# Patient Record
Sex: FEMALE
[De-identification: names, ages, dates, MRNs, and addresses within clinical notes are randomized per-mention and may not be internally consistent; named-entity substitution may affect disease eponyms.]

---

## 2017-04-24 ENCOUNTER — HOSPITAL ENCOUNTER (EMERGENCY)
Dept: HOSPITAL 14 - H.ER | Age: 47
Discharge: HOME | End: 2017-04-24
Payer: MEDICAID

## 2017-04-24 VITALS
OXYGEN SATURATION: 96 % | HEART RATE: 56 BPM | SYSTOLIC BLOOD PRESSURE: 112 MMHG | TEMPERATURE: 98 F | DIASTOLIC BLOOD PRESSURE: 72 MMHG

## 2017-04-24 VITALS — BODY MASS INDEX: 25.4 KG/M2

## 2017-04-24 VITALS — RESPIRATION RATE: 16 BRPM

## 2017-04-24 DIAGNOSIS — G43.909: Primary | ICD-10-CM

## 2017-04-24 DIAGNOSIS — R11.2: ICD-10-CM

## 2017-04-24 NOTE — ED PDOC
HPI: General Adult


Time Seen by Provider: 04/24/17 15:40


Chief Complaint (Nursing): GI Problem


Chief Complaint (Provider): HEADACHE/VOMITING


History Per: Patient (47 Y/O FEMALE HERE WITH COMPLAINT OF INTERMITTENT 

HEADACHE LEFT SIDED AND TODAY RIGHT SIDED NOTED BY [facility] WITH NAUSEA X 

1 WEEK.  HAS H/O MIGRAINE HEADACHES.  NO FEVER/URI/NASAL DISCHARGE/COUGH.)





Past Medical History


Reviewed: Historical Data, Nursing Documentation, Vital Signs


Vital Signs: 


 Last Vital Signs











Temp  97.5 F L  04/24/17 14:53


 


Pulse  63   04/24/17 14:53


 


Resp  16   04/24/17 14:53


 


BP  125/79   04/24/17 14:53


 


Pulse Ox  99   04/24/17 15:42














- Medical History


PMH: Migraine


   Denies: Depression





- Family History


Family History: States: Unknown Family Hx





- Immunization History


Hx Tetanus Toxoid Vaccination: Yes


Hx Influenza Vaccination: Yes


Hx Pneumococcal Vaccination: Yes





- Home Medications


Home Medications: 


 Ambulatory Orders











 Medication  Instructions  Recorded


 


Acetaminophen/Butalbital/Caf 1 tab PO TID PRN #20 tab 09/09/16





[Fioricet]  


 


Ibuprofen [Advil] 400 mg PO PRN PRN 09/09/16


 


Ibuprofen [Motrin] 600 mg PO Q8 #30 tab 09/09/16


 


Dicyclomine [Bentyl] 20 mg PO TID #30 tab 01/09/17














- Allergies


Allergies/Adverse Reactions: 


 Allergies











Allergy/AdvReac Type Severity Reaction Status Date / Time


 


No Known Allergies Allergy   Verified 09/09/16 14:55














Review of Systems


ROS Statement: Except As Marked, All Systems Reviewed And Found Negative





Physical Exam





- Reviewed


Nursing Documentation Reviewed: Yes


Vital Signs Reviewed: Yes





- Physical Exam


Appears: Positive for: Well, Non-toxic, No Acute Distress


Head Exam: Positive for: ATRAUMATIC, NORMAL INSPECTION, NORMOCEPHALIC


Skin: Positive for: Normal Color, Warm, DRY


Eye Exam: Positive for: EOMI, Normal appearance, PERRL


ENT: Positive for: Normal ENT Inspection


Neck: Positive for: Normal, Painless ROM


Cardiovascular/Chest: Positive for: Regular Rate, Rhythm


Respiratory: Positive for: CNT, Normal Breath Sounds


Gastrointestinal/Abdominal: Positive for: Normal Exam, Bowel Sounds, Soft


Back: Positive for: Normal Inspection


Extremity: Positive for: Normal ROM


Neurologic/Psych: Positive for: Alert, Oriented





- ECG


O2 Sat by Pulse Oximetry: 99





- Progress


ED Course And Treament: 


reglan 10 mg iv x 1 dose





ns 1 liter wide open





RE-evaluated 18:48pm  Patient's headache resolved.  Patient feels improved.





Disposition





- Clinical Impression


Clinical Impression: 


 Migraine headache





- Patient ED Disposition


Is Patient to be Admitted: No





- Disposition


Referrals: 


[facility] [Outside]


Disposition: Routine/Home


Disposition Time: 18:49


Condition: FAIR


Instructions:  Migraine Headache (ED)


Forms:  [facility] ED School/Work Excuse


Print Language: Sierra Leonean

## 2018-01-11 ENCOUNTER — HOSPITAL ENCOUNTER (EMERGENCY)
Dept: HOSPITAL 14 - H.ER | Age: 48
Discharge: HOME | End: 2018-01-11
Payer: COMMERCIAL

## 2018-01-11 VITALS
DIASTOLIC BLOOD PRESSURE: 84 MMHG | TEMPERATURE: 98 F | OXYGEN SATURATION: 98 % | RESPIRATION RATE: 16 BRPM | SYSTOLIC BLOOD PRESSURE: 128 MMHG | HEART RATE: 83 BPM

## 2018-01-11 VITALS — BODY MASS INDEX: 25.4 KG/M2

## 2018-01-11 DIAGNOSIS — G43.909: Primary | ICD-10-CM

## 2018-01-11 PROCEDURE — 96365 THER/PROPH/DIAG IV INF INIT: CPT

## 2018-01-11 PROCEDURE — 99282 EMERGENCY DEPT VISIT SF MDM: CPT

## 2018-01-11 PROCEDURE — 96367 TX/PROPH/DG ADDL SEQ IV INF: CPT

## 2018-02-19 ENCOUNTER — HOSPITAL ENCOUNTER (EMERGENCY)
Dept: HOSPITAL 31 - C.ER | Age: 48
Discharge: HOME | End: 2018-02-19
Payer: COMMERCIAL

## 2018-02-19 VITALS
SYSTOLIC BLOOD PRESSURE: 120 MMHG | DIASTOLIC BLOOD PRESSURE: 81 MMHG | HEART RATE: 72 BPM | TEMPERATURE: 98.2 F | OXYGEN SATURATION: 98 %

## 2018-02-19 VITALS — BODY MASS INDEX: 25.4 KG/M2

## 2018-02-19 VITALS — RESPIRATION RATE: 18 BRPM

## 2018-02-19 DIAGNOSIS — J32.9: Primary | ICD-10-CM

## 2018-02-19 DIAGNOSIS — R51: ICD-10-CM

## 2018-02-19 PROCEDURE — 96375 TX/PRO/DX INJ NEW DRUG ADDON: CPT

## 2018-02-19 PROCEDURE — 96374 THER/PROPH/DIAG INJ IV PUSH: CPT

## 2018-02-19 PROCEDURE — 99285 EMERGENCY DEPT VISIT HI MDM: CPT

## 2018-02-19 PROCEDURE — 70450 CT HEAD/BRAIN W/O DYE: CPT

## 2018-02-19 NOTE — C.PDOC
History Of Present Illness


48 y/o female presents to ED with complaints of gradual onset headache 

radiating to neck and right arm for 4 days. Patient reports associated nausea, 

dizziness "room spinning" sensation and photophobia. Patient states she has 

been having this type of headache for 2 years and was seen at ED in past for 

same but denies follow up with neurology. Patient took 2 ibuprofen this morning 

with no relief. Patient admits to cloudy vision and denies "worse headache of 

my life", associated thunder clap, vomiting or any other complaints at this 

time. 


Time Seen by Provider: 02/19/18 14:01


Chief Complaint (Nursing): Headache


History Per: Patient


History/Exam Limitations: no limitations


Onset/Duration Of Symptoms: Days


Current Symptoms Are (Timing): Still Present


Quality: "Pain"


Associated Symptoms: Photophobia, Blurred Vision, Nausea





Past Medical History


Reviewed: Historical Data, Nursing Documentation, Vital Signs


Vital Signs: 


 Last Vital Signs











Temp  98.0 F   02/19/18 12:38


 


Pulse  82   02/19/18 12:38


 


Resp  18   02/19/18 12:38


 


BP  121/81   02/19/18 12:38


 


Pulse Ox  99   02/19/18 15:37














- Medical History


PMH: Migraine


Surgical History: No Surg Hx


Family History: States: No Known Family Hx





- Social History


Hx Alcohol Use: No


Hx Substance Use: No





- Immunization History


Hx Tetanus Toxoid Vaccination: No


Hx Influenza Vaccination: No


Hx Pneumococcal Vaccination: No





Review Of Systems


Except As Marked, All Systems Reviewed And Found Negative.


Eyes: Positive for: Vision Change


Gastrointestinal: Positive for: Nausea


Musculoskeletal: Positive for: Neck Pain, Arm Pain


Neurological: Positive for: Headache, Dizziness





Physical Exam





- Physical Exam


Appears: Non-toxic, No Acute Distress


Skin: Warm, Dry, No Rash


Head: Atraumatic, Normacephalic


Eye(s): bilateral: Normal Inspection


Oral Mucosa: Moist


Neck: Normal ROM, Supple


Cardiovascular: Rhythm Regular


Respiratory: Normal Breath Sounds, No Rales, No Rhonchi, No Wheezing


Gastrointestinal/Abdominal: Soft, No Tenderness, No Guarding, No Rebound


Neurological/Psych: Oriented x3, Normal Speech, Normal Cranial Nerves, Normal 

Motor, Normal Sensation


Gait: Steady





ED Course And Treatment


O2 Sat by Pulse Oximetry: 99 (RA)


Pulse Ox Interpretation: Normal





Medical Decision Making


Medical Decision Making: 


Assessment: HA, Sinusitis





Progress: 


On re evaluation patient feeling better will be d/c with headache and sinusitis 

advised follow up with Dr. Alvarez 





Disposition


Counseled Patient/Family Regarding: Studies Performed, Diagnosis, Need For 

Followup, Rx Given





- Disposition


Referrals: 


Veda Alvarez MD [Staff Provider] - 


Disposition: HOME/ ROUTINE


Disposition Time: 15:36


Condition: STABLE


Additional Instructions: 


follow up with your doctor in 2 days


call to make an appointment


take medications as prescribed


return to ED if symptoms worsens or progress


Prescriptions: 


Naproxen [Naprosyn] 500 mg PO BID PRN #16 tab


 PRN Reason: Pain, Moderate (4-7)


Sulfamethoxazole/Trimethoprim [Bactrim  mg-160 mg] 1 tab PO BID #14 tab


Instructions:  Sinus Headache (DC)


Forms:  Gen Discharge Inst Bolivian, [facility] (Bolivian)


Print Language: Korean





- Clinical Impression


Clinical Impression: 


 Headache, Sinusitis








- Scribe Statement


The provider has reviewed the documentation as recorded by the Farzanehibjuliet Evans





All medical record entries made by the Farzanehibjuliet were at my direction and 

personally dictated by me. I have reviewed the chart and agree that the record 

accurately reflects my personal performance of the history, physical exam, 

medical decision making, and the department course for this patient. I have 

also personally directed, reviewed, and agree with the discharge instructions 

and disposition.

## 2018-02-19 NOTE — CT
PROCEDURE:  CT HEAD WITHOUT CONTRAST.



HISTORY:

dizziness



COMPARISON:

None available. 



TECHNIQUE:

Axial computed tomography images were obtained through the head/brain 

without intravenous contrast.  



Radiation dose:



Total exam DLP = 826.89 mGy-cm.



This CT exam was performed using one or more of the following dose 

reduction techniques: Automated exposure control, adjustment of the 

mA and/or kV according to patient size, and/or use of iterative 

reconstruction technique.



FINDINGS:



HEMORRHAGE:

No intracranial hemorrhage. 



BRAIN:

No mass effect or edema. Intracranial atherosclerosis. Bilateral 

basal ganglia calcifications. The gray-white matter differentiation 

appears intact. Please note that MRI with diffusion imaging is more 

sensitive in the detection of acute ischemic event.



VENTRICLES:

No hydrocephalus. 



CALVARIUM:

Unremarkable.



PARANASAL SINUSES:

Extensive mucosal thickening of the ethmoid air cells.  Small fluid/ 

mucosal thickening of the partially imaged bilateral maxillary 

sinuses.



MASTOID AIR CELLS:

Unremarkable as visualized. No inflammatory changes.



OTHER FINDINGS:

None.



IMPRESSION:

No acute intracranial pathology identified.



Extensive mucosal thickening of the ethmoid air cells.  Small fluid/ 

mucosal thickening of the partially imaged bilateral maxillary 

sinuses. Correlate clinically for sinusitis.

## 2018-12-02 ENCOUNTER — HOSPITAL ENCOUNTER (EMERGENCY)
Dept: HOSPITAL 14 - H.ER | Age: 48
Discharge: HOME | End: 2018-12-02
Payer: MEDICAID

## 2018-12-02 VITALS
TEMPERATURE: 97.2 F | DIASTOLIC BLOOD PRESSURE: 89 MMHG | HEART RATE: 71 BPM | RESPIRATION RATE: 15 BRPM | SYSTOLIC BLOOD PRESSURE: 132 MMHG

## 2018-12-02 VITALS — BODY MASS INDEX: 25.4 KG/M2

## 2018-12-02 DIAGNOSIS — J32.9: ICD-10-CM

## 2018-12-02 DIAGNOSIS — G43.909: Primary | ICD-10-CM

## 2018-12-02 PROCEDURE — 99283 EMERGENCY DEPT VISIT LOW MDM: CPT

## 2018-12-02 PROCEDURE — 96374 THER/PROPH/DIAG INJ IV PUSH: CPT

## 2018-12-02 PROCEDURE — 87804 INFLUENZA ASSAY W/OPTIC: CPT

## 2018-12-02 PROCEDURE — 96375 TX/PRO/DX INJ NEW DRUG ADDON: CPT

## 2018-12-02 NOTE — ED PDOC
HPI:  Headache


Time Seen by Provider: 12/02/18 13:53


Chief Complaint (Nursing): Cough, Cold, Congestion


Chief Complaint (Provider): headaches


History Per: Patient


History/Exam Limitations: no limitations


Onset/Duration Of Symptoms: Persistent (x1 week)


Current Symptoms Are (Timing): Still Present


Additional Complaint(s): 


48 year old female with pmHx of migraines, presents to ED with a complaint of 

headaches that radiates from right to left associated with nasal congestion, 

copious nasal discharge, sneezing, subjective fever, and chills for the past 

week. She denies any cough, shortness of breath, chest pain, sore throat, ear 

pain, visual changes, weakness, gait instability, change in speech, recent sick 

contacts, or receiving flu shot this year. Patient states symptoms feel similar 

to previous migraines and has been taking ibuprofen with minimal relief. She 

also reports that she does not take medications chronically for migraines. 





PCP: none provided





Past Medical History


Reviewed: Historical Data, Nursing Documentation, Vital Signs


Vital Signs: 





                                Last Vital Signs











Temp  97 F L  12/02/18 13:47


 


Pulse  74   12/02/18 13:47


 


Resp      


 


BP  137/94 H  12/02/18 13:47


 


Pulse Ox  99   12/02/18 13:47














- Medical History


PMH: Migraine


   Denies: Depression





- Family History


Family History: States: Unknown Family Hx





- Immunization History


Hx Tetanus Toxoid Vaccination: No


Hx Influenza Vaccination: No


Hx Pneumococcal Vaccination: No





- Home Medications


Home Medications: 


                                Ambulatory Orders











 Medication  Instructions  Recorded


 


Ibuprofen [Motrin] 400 mg PO Q8 02/19/18


 


Naproxen [Naprosyn] 500 mg PO BID PRN #16 tab 02/19/18


 


Sulfamethoxazole/Trimethoprim 1 tab PO BID #14 tab 02/19/18





[Bactrim  mg-160 mg]  


 


Acetaminophen/Butalbital/Caf 1 tab PO Q4 PRN 5 Days  tab 12/02/18





[Fioricet]  


 


Fluticasone Propionate [Flonase] 2 spr NS DAILY PRN 7 Days  bottle 12/02/18














- Allergies


Allergies/Adverse Reactions: 


                                    Allergies











Allergy/AdvReac Type Severity Reaction Status Date / Time


 


No Known Allergies Allergy   Verified 02/19/18 12:41














Review of Systems


ROS Statement: Except As Marked, All Systems Reviewed And Found Negative


Constitutional: Positive for: Fever (subjective), Chills


Eyes: Negative for: Vision Change


ENT: Positive for: Nose Discharge, Nose Congestion (with sneezing).  Negative 

for: Ear Pain, Throat Pain


Cardiovascular: Negative for: Chest Pain


Respiratory: Negative for: Cough, Shortness of Breath


Gastrointestinal: Positive for: Nausea


Neurological: Positive for: Headache.  Negative for: Weakness, Incoordination, 

Change in Speech





Physical Exam





- Reviewed


Nursing Documentation Reviewed: Yes


Vital Signs Reviewed: Yes





- Physical Exam


Appears: Positive for: Non-toxic, Uncomfortable


Head Exam: Positive for: ATRAUMATIC, NORMAL INSPECTION, NORMOCEPHALIC


Skin: Positive for: Normal Color


Eye Exam: Positive for: EOMI, PERRL, Conjunctival injection (mild bilaterally)


ENT: Positive for: Nasal Congestion (edema of bilateral turbinates), Other 

(tenderness on palpation of maxillary and frontal sinuses).  Negative for: 

Pharyngeal Erythema, Tonsillar Exudate, Tonsillar Swelling


Neck: Positive for: Normal, Supple


Cardiovascular/Chest: Positive for: Regular Rate, Rhythm, Chest Non Tender


Respiratory: Positive for: Rhonchi (occasional).  Negative for: Rales, Wheezing,

Respiratory Distress


Lymphatic: Negative for: Adenopathy


Neurologic/Psych: Positive for: Alert, Oriented, Gait (steady, unassisted).  

Negative for: Motor/Sensory Deficits, Aphasia





- ECG


O2 Sat by Pulse Oximetry: 99 (RA)


Pulse Ox Interpretation: Normal





Medical Decision Making


Medical Decision Making: 


Time: 1444


Initial Plan: 


* Flonase


* Reglan 10mg IVP


* Toradol 30mg IVP


* Rapid influenza A B


* IV fluids


* Urine pregnancy





Time: 1625


--Negative for influenza. Upon provider reevaluation, patient is medically 

stable and requires no further treatment in the ED at this time. Patient will be

discharged home with Rx for Fioricet and Flonase. Counseling was provided and 

all questions were answered regarding diagnosis. There is agreement to discharge

plan. Return if symptoms persist or worsen.





Clinical Impression: Sinusitis; Migraine





 

--------------------------------------------------------------------------------


-----------------


Scribe Attestation:


Documented by Elva Harden, acting as a scribe for IVETTE Barrera.


Provider Scribe Attestation:


All medical record entries made by the Scribe were at my direction and p

ersonally dictated by me. I have reviewed the chart and agree that the record 

accurately reflects my personal performance of the history, physical exam, 

medical decision making, and the department course for this patient. I have also

personally directed, reviewed, and agree with the discharge instructions and 

disposition.





Disposition





- Clinical Impression


Clinical Impression: 


 Sinusitis, Migraine








- Patient ED Disposition


Is Patient to be Admitted: No


Counseled Patient/Family Regarding: Studies Performed, Diagnosis, Need For 

Followup, Rx Given





- Disposition


Referrals: 


Dalton Winters MD [Staff Provider] - 


Disposition: Routine/Home


Disposition Time: 16:25


Condition: STABLE


Additional Instructions: 


Take Flonase as needed for nasal congestion. Take Fioricet as needed for 

Headache if not improved with Flonase. Tylenol or Ibuprofen for fevers/body 

aches however do not take with Fioricet as it contains Tylenol/Acetaminophen. 

Return to ER if your Headache worsens or if you start to have visual problems or

weakness on one side. 


Prescriptions: 


Acetaminophen/Butalbital/Caf [Fioricet] 1 tab PO Q4 PRN 5 Days  tab


 PRN Reason: Migraine Headache


Fluticasone Propionate [Flonase] 2 spr NS DAILY PRN 7 Days  bottle


 PRN Reason: Nasal Congestion


Instructions:  Migraine Headache (DC), Sinusitis, Adult (DC), Sinus Headache 

(DC)


Forms:  [facility] (Thai)


Print Language: Japanese

## 2018-12-03 VITALS — OXYGEN SATURATION: 99 %

## 2020-02-10 ENCOUNTER — HOSPITAL ENCOUNTER (INPATIENT)
Dept: HOSPITAL 87 - ER | Age: 50
LOS: 3 days | Discharge: HOME | DRG: 194 | End: 2020-02-13
Attending: INTERNAL MEDICINE | Admitting: INTERNAL MEDICINE
Payer: MEDICAID

## 2020-02-10 VITALS — WEIGHT: 129 LBS | BODY MASS INDEX: 25.32 KG/M2 | HEIGHT: 60 IN

## 2020-02-10 DIAGNOSIS — I13.0: Primary | ICD-10-CM

## 2020-02-10 DIAGNOSIS — Z87.01: ICD-10-CM

## 2020-02-10 DIAGNOSIS — E78.00: ICD-10-CM

## 2020-02-10 DIAGNOSIS — E87.5: ICD-10-CM

## 2020-02-10 DIAGNOSIS — I25.10: ICD-10-CM

## 2020-02-10 DIAGNOSIS — I42.9: ICD-10-CM

## 2020-02-10 DIAGNOSIS — E78.5: ICD-10-CM

## 2020-02-10 DIAGNOSIS — D64.9: ICD-10-CM

## 2020-02-10 DIAGNOSIS — J96.00: ICD-10-CM

## 2020-02-10 DIAGNOSIS — I27.20: ICD-10-CM

## 2020-02-10 DIAGNOSIS — Z79.82: ICD-10-CM

## 2020-02-10 DIAGNOSIS — I45.10: ICD-10-CM

## 2020-02-10 DIAGNOSIS — Z95.810: ICD-10-CM

## 2020-02-10 DIAGNOSIS — K29.70: ICD-10-CM

## 2020-02-10 DIAGNOSIS — N18.9: ICD-10-CM

## 2020-02-10 DIAGNOSIS — J84.9: ICD-10-CM

## 2020-02-10 DIAGNOSIS — E46: ICD-10-CM

## 2020-02-10 DIAGNOSIS — E11.22: ICD-10-CM

## 2020-02-10 DIAGNOSIS — N92.0: ICD-10-CM

## 2020-02-10 DIAGNOSIS — Z79.4: ICD-10-CM

## 2020-02-10 DIAGNOSIS — Z79.899: ICD-10-CM

## 2020-02-10 DIAGNOSIS — I25.2: ICD-10-CM

## 2020-02-10 DIAGNOSIS — N17.9: ICD-10-CM

## 2020-02-10 DIAGNOSIS — Z97.5: ICD-10-CM

## 2020-02-10 DIAGNOSIS — I50.33: ICD-10-CM

## 2020-02-10 LAB
BASOPHILS NFR BLD AUTO: 1.1 % (ref 0–2)
CHLORIDE SERPL-SCNC: 103 MEQ/L (ref 98–107)
EOSINOPHIL NFR BLD AUTO: 1.8 % (ref 0–5)
ERYTHROCYTE [DISTWIDTH] IN BLOOD BY AUTOMATED COUNT: 18.2 % (ref 11.6–14.6)
HCT VFR BLD AUTO: 26.4 % (ref 36–48)
HGB BLD-MCNC: 8.6 G/DL (ref 12–16)
INR PPP: 1
LYMPHOCYTES NFR BLD AUTO: 18.5 % (ref 20–50)
MCH RBC QN AUTO: 24.9 PG (ref 28–32)
MCV RBC AUTO: 76.2 FL (ref 81–99)
MONOCYTES NFR BLD AUTO: 4.3 % (ref 2–8)
NEUTROPHILS NFR BLD AUTO: 74.3 % (ref 40–76)
PLATELET # BLD AUTO: 424 X1000/UL (ref 130–400)
PMV BLD AUTO: 7.3 FL (ref 7.4–10.4)
PROTHROMBIN TIME: 10.1 SEC (ref 9.6–11)
RBC # BLD AUTO: 3.46 MILL/UL (ref 4.2–5.4)

## 2020-02-10 PROCEDURE — 85025 COMPLETE CBC W/AUTO DIFF WBC: CPT

## 2020-02-10 PROCEDURE — 93306 TTE W/DOPPLER COMPLETE: CPT

## 2020-02-10 PROCEDURE — 71275 CT ANGIOGRAPHY CHEST: CPT

## 2020-02-10 PROCEDURE — 83036 HEMOGLOBIN GLYCOSYLATED A1C: CPT

## 2020-02-10 PROCEDURE — 86900 BLOOD TYPING SEROLOGIC ABO: CPT

## 2020-02-10 PROCEDURE — 82962 GLUCOSE BLOOD TEST: CPT

## 2020-02-10 PROCEDURE — 85379 FIBRIN DEGRADATION QUANT: CPT

## 2020-02-10 PROCEDURE — 86850 RBC ANTIBODY SCREEN: CPT

## 2020-02-10 PROCEDURE — 84484 ASSAY OF TROPONIN QUANT: CPT

## 2020-02-10 PROCEDURE — 83880 ASSAY OF NATRIURETIC PEPTIDE: CPT

## 2020-02-10 PROCEDURE — 93970 EXTREMITY STUDY: CPT

## 2020-02-10 PROCEDURE — 86920 COMPATIBILITY TEST SPIN: CPT

## 2020-02-10 PROCEDURE — 94640 AIRWAY INHALATION TREATMENT: CPT

## 2020-02-10 PROCEDURE — 93005 ELECTROCARDIOGRAM TRACING: CPT

## 2020-02-10 PROCEDURE — 80061 LIPID PANEL: CPT

## 2020-02-10 PROCEDURE — 80048 BASIC METABOLIC PNL TOTAL CA: CPT

## 2020-02-10 PROCEDURE — 82553 CREATINE MB FRACTION: CPT

## 2020-02-10 PROCEDURE — 80053 COMPREHEN METABOLIC PANEL: CPT

## 2020-02-10 PROCEDURE — 78582 LUNG VENTILAT&PERFUS IMAGING: CPT

## 2020-02-10 PROCEDURE — 84439 ASSAY OF FREE THYROXINE: CPT

## 2020-02-10 PROCEDURE — 82550 ASSAY OF CK (CPK): CPT

## 2020-02-10 PROCEDURE — 99285 EMERGENCY DEPT VISIT HI MDM: CPT

## 2020-02-10 PROCEDURE — 84443 ASSAY THYROID STIM HORMONE: CPT

## 2020-02-10 PROCEDURE — 36415 COLL VENOUS BLD VENIPUNCTURE: CPT

## 2020-02-10 PROCEDURE — 71045 X-RAY EXAM CHEST 1 VIEW: CPT

## 2020-02-11 VITALS — DIASTOLIC BLOOD PRESSURE: 68 MMHG | SYSTOLIC BLOOD PRESSURE: 144 MMHG

## 2020-02-11 VITALS — SYSTOLIC BLOOD PRESSURE: 132 MMHG | DIASTOLIC BLOOD PRESSURE: 64 MMHG

## 2020-02-11 VITALS — DIASTOLIC BLOOD PRESSURE: 73 MMHG | SYSTOLIC BLOOD PRESSURE: 168 MMHG

## 2020-02-11 VITALS — DIASTOLIC BLOOD PRESSURE: 69 MMHG | SYSTOLIC BLOOD PRESSURE: 147 MMHG

## 2020-02-11 LAB
CK MB SERPL-CCNC: 1.6 NG/ML (ref 0.5–3.6)
CK SERPL-CCNC: 75 IU/L (ref 26–192)

## 2020-02-11 RX ADMIN — INSULIN LISPRO SCH UNIT: 100 INJECTION, SOLUTION INTRAVENOUS; SUBCUTANEOUS at 07:40

## 2020-02-11 RX ADMIN — INSULIN LISPRO SCH UNIT: 100 INJECTION, SOLUTION INTRAVENOUS; SUBCUTANEOUS at 22:29

## 2020-02-11 RX ADMIN — Medication SCH ML: at 13:24

## 2020-02-11 RX ADMIN — Medication SCH STRIP: at 17:51

## 2020-02-11 RX ADMIN — Medication SCH STRIP: at 07:25

## 2020-02-11 RX ADMIN — ENOXAPARIN SODIUM SCH MG: 60 INJECTION SUBCUTANEOUS at 22:20

## 2020-02-11 RX ADMIN — ENOXAPARIN SODIUM SCH MG: 60 INJECTION SUBCUTANEOUS at 18:22

## 2020-02-11 RX ADMIN — INSULIN LISPRO SCH UNIT: 100 INJECTION, SOLUTION INTRAVENOUS; SUBCUTANEOUS at 12:42

## 2020-02-11 RX ADMIN — Medication SCH STRIP: at 21:00

## 2020-02-11 RX ADMIN — Medication SCH ML: at 22:20

## 2020-02-11 RX ADMIN — Medication SCH STRIP: at 12:27

## 2020-02-11 RX ADMIN — INSULIN LISPRO SCH UNIT: 100 INJECTION, SOLUTION INTRAVENOUS; SUBCUTANEOUS at 18:21

## 2020-02-12 VITALS — DIASTOLIC BLOOD PRESSURE: 62 MMHG | SYSTOLIC BLOOD PRESSURE: 127 MMHG

## 2020-02-12 VITALS — DIASTOLIC BLOOD PRESSURE: 67 MMHG | SYSTOLIC BLOOD PRESSURE: 146 MMHG

## 2020-02-12 VITALS — SYSTOLIC BLOOD PRESSURE: 127 MMHG | DIASTOLIC BLOOD PRESSURE: 63 MMHG

## 2020-02-12 VITALS — DIASTOLIC BLOOD PRESSURE: 61 MMHG | SYSTOLIC BLOOD PRESSURE: 147 MMHG

## 2020-02-12 VITALS — DIASTOLIC BLOOD PRESSURE: 70 MMHG | SYSTOLIC BLOOD PRESSURE: 150 MMHG

## 2020-02-12 VITALS — SYSTOLIC BLOOD PRESSURE: 147 MMHG | DIASTOLIC BLOOD PRESSURE: 61 MMHG

## 2020-02-12 VITALS — DIASTOLIC BLOOD PRESSURE: 66 MMHG | SYSTOLIC BLOOD PRESSURE: 143 MMHG

## 2020-02-12 VITALS — SYSTOLIC BLOOD PRESSURE: 147 MMHG | DIASTOLIC BLOOD PRESSURE: 70 MMHG

## 2020-02-12 VITALS — DIASTOLIC BLOOD PRESSURE: 67 MMHG | SYSTOLIC BLOOD PRESSURE: 148 MMHG

## 2020-02-12 VITALS — DIASTOLIC BLOOD PRESSURE: 71 MMHG | SYSTOLIC BLOOD PRESSURE: 149 MMHG

## 2020-02-12 VITALS — SYSTOLIC BLOOD PRESSURE: 181 MMHG | DIASTOLIC BLOOD PRESSURE: 67 MMHG

## 2020-02-12 LAB
BASOPHILS NFR BLD AUTO: 0.6 % (ref 0–2)
CHLORIDE SERPL-SCNC: 103 MEQ/L (ref 98–107)
CK MB SERPL-CCNC: 1.7 NG/ML (ref 0.5–3.6)
CK SERPL-CCNC: 74 IU/L (ref 26–192)
EOSINOPHIL NFR BLD AUTO: 0.4 % (ref 0–5)
ERYTHROCYTE [DISTWIDTH] IN BLOOD BY AUTOMATED COUNT: 18.5 % (ref 11.6–14.6)
HCT VFR BLD AUTO: 21.5 % (ref 36–48)
HDLC SERPL-MCNC: 60 MG/DL (ref 40–59)
HGB BLD-MCNC: 7.1 G/DL (ref 12–16)
LDLC SERPL DIRECT ASSAY-MCNC: 70 MG/DL (ref 5–100)
LYMPHOCYTES NFR BLD AUTO: 14.7 % (ref 20–50)
MCH RBC QN AUTO: 25.2 PG (ref 28–32)
MCV RBC AUTO: 75.9 FL (ref 81–99)
MONOCYTES NFR BLD AUTO: 4.6 % (ref 2–8)
NEUTROPHILS NFR BLD AUTO: 79.7 % (ref 40–76)
PLATELET # BLD AUTO: 308 X1000/UL (ref 130–400)
PMV BLD AUTO: 7.9 FL (ref 7.4–10.4)
RBC # BLD AUTO: 2.84 MILL/UL (ref 4.2–5.4)
T4 FREE SERPL-MCNC: 1.17 NG/DL (ref 0.76–1.46)

## 2020-02-12 PROCEDURE — 30233N1 TRANSFUSION OF NONAUTOLOGOUS RED BLOOD CELLS INTO PERIPHERAL VEIN, PERCUTANEOUS APPROACH: ICD-10-PCS | Performed by: INTERNAL MEDICINE

## 2020-02-12 RX ADMIN — INSULIN LISPRO SCH UNIT: 100 INJECTION, SOLUTION INTRAVENOUS; SUBCUTANEOUS at 12:20

## 2020-02-12 RX ADMIN — Medication SCH ML: at 23:28

## 2020-02-12 RX ADMIN — Medication SCH ML: at 05:14

## 2020-02-12 RX ADMIN — Medication SCH STRIP: at 17:20

## 2020-02-12 RX ADMIN — CLONIDINE HYDROCHLORIDE PRN MG: 0.1 TABLET ORAL at 09:01

## 2020-02-12 RX ADMIN — INSULIN LISPRO SCH UNIT: 100 INJECTION, SOLUTION INTRAVENOUS; SUBCUTANEOUS at 20:11

## 2020-02-12 RX ADMIN — INSULIN LISPRO SCH UNIT: 100 INJECTION, SOLUTION INTRAVENOUS; SUBCUTANEOUS at 08:56

## 2020-02-12 RX ADMIN — Medication SCH STRIP: at 12:00

## 2020-02-12 RX ADMIN — Medication SCH STRIP: at 20:03

## 2020-02-12 RX ADMIN — Medication SCH ML: at 13:27

## 2020-02-12 RX ADMIN — Medication SCH STRIP: at 07:50

## 2020-02-12 RX ADMIN — INSULIN LISPRO SCH UNIT: 100 INJECTION, SOLUTION INTRAVENOUS; SUBCUTANEOUS at 18:38

## 2020-02-13 VITALS — DIASTOLIC BLOOD PRESSURE: 59 MMHG | SYSTOLIC BLOOD PRESSURE: 181 MMHG

## 2020-02-13 VITALS — SYSTOLIC BLOOD PRESSURE: 150 MMHG | DIASTOLIC BLOOD PRESSURE: 70 MMHG

## 2020-02-13 VITALS — DIASTOLIC BLOOD PRESSURE: 70 MMHG | SYSTOLIC BLOOD PRESSURE: 150 MMHG

## 2020-02-13 VITALS — SYSTOLIC BLOOD PRESSURE: 200 MMHG | DIASTOLIC BLOOD PRESSURE: 81 MMHG

## 2020-02-13 LAB
BASOPHILS NFR BLD AUTO: 0.8 % (ref 0–2)
CHLORIDE SERPL-SCNC: 103 MEQ/L (ref 98–107)
EOSINOPHIL NFR BLD AUTO: 1.2 % (ref 0–5)
ERYTHROCYTE [DISTWIDTH] IN BLOOD BY AUTOMATED COUNT: 18.6 % (ref 11.6–14.6)
HCT VFR BLD AUTO: 24.7 % (ref 36–48)
HGB BLD-MCNC: 8.3 G/DL (ref 12–16)
LYMPHOCYTES NFR BLD AUTO: 36.6 % (ref 20–50)
MCH RBC QN AUTO: 26.3 PG (ref 28–32)
MCV RBC AUTO: 77.9 FL (ref 81–99)
MONOCYTES NFR BLD AUTO: 10 % (ref 2–8)
NEUTROPHILS NFR BLD AUTO: 51.4 % (ref 40–76)
PLATELET # BLD AUTO: 273 X1000/UL (ref 130–400)
PMV BLD AUTO: 8.1 FL (ref 7.4–10.4)
RBC # BLD AUTO: 3.16 MILL/UL (ref 4.2–5.4)

## 2020-02-13 RX ADMIN — INSULIN LISPRO SCH UNIT: 100 INJECTION, SOLUTION INTRAVENOUS; SUBCUTANEOUS at 12:32

## 2020-02-13 RX ADMIN — Medication SCH STRIP: at 12:02

## 2020-02-13 RX ADMIN — Medication SCH ML: at 14:00

## 2020-02-13 RX ADMIN — INSULIN LISPRO SCH UNIT: 100 INJECTION, SOLUTION INTRAVENOUS; SUBCUTANEOUS at 06:16

## 2020-02-13 RX ADMIN — CLONIDINE HYDROCHLORIDE PRN MG: 0.1 TABLET ORAL at 08:50

## 2020-02-13 RX ADMIN — Medication SCH STRIP: at 06:16

## 2020-02-13 RX ADMIN — Medication SCH ML: at 07:52
